# Patient Record
Sex: MALE | Race: WHITE | NOT HISPANIC OR LATINO | Employment: FULL TIME | RURAL
[De-identification: names, ages, dates, MRNs, and addresses within clinical notes are randomized per-mention and may not be internally consistent; named-entity substitution may affect disease eponyms.]

---

## 2018-12-06 ENCOUNTER — HISTORICAL (OUTPATIENT)
Dept: ADMINISTRATIVE | Facility: HOSPITAL | Age: 63
End: 2018-12-06

## 2018-12-10 LAB
LAB AP CLINICAL INFORMATION: NORMAL
LAB AP COMMENTS: NORMAL
LAB AP DIAGNOSIS - HISTORICAL: NORMAL
LAB AP GROSS PATHOLOGY - HISTORICAL: NORMAL
LAB AP SPECIMEN SUBMITTED - HISTORICAL: NORMAL

## 2022-01-21 NOTE — PATIENT INSTRUCTIONS
Sunscreen Recommendations   I recommended a broad spectrum sunscreen with a SPF of 30 or higher that is water-resistant. SPF 30 sunscreens block approximately 97 percent of the sun's harmful rays.    Sunscreens should be applied at least 15 minutes prior to expected sun exposure and then every 90 minutes after that as long as sun exposure continues.    If swimming or exercising sunscreen should be reapplied every 45 minutes to an hour after getting wet or sweating.   One ounce, or the equivalent of a shot glass full of sunscreen, is adequate to protect the skin not covered by a bathing suit.    I also recommended a lip balm with a sunscreen as well.   Healthy Sun Protective Behaviors   Sun protective clothing can be used in lieu of sunscreen but must be worn the entire time you are exposed to the sun's rays.   Seek shade between 10 a.m. and 2 p.m.   Use extra caution near water, snow, or sand as they reflect sun rays   Avoid tanning beds and consider sunless self-tanning products instead   Perform monthly self skin exams     Cryotherapy   There will likely be a blister.    Clean the area daily with dial antibacterial soap and water.    Apply vaseline as needed.    The area will take 1-2 weeks to heal.

## 2022-01-24 ENCOUNTER — OFFICE VISIT (OUTPATIENT)
Dept: DERMATOLOGY | Facility: CLINIC | Age: 67
End: 2022-01-24
Payer: COMMERCIAL

## 2022-01-24 VITALS — BODY MASS INDEX: 24.38 KG/M2 | HEIGHT: 74 IN | RESPIRATION RATE: 18 BRPM | WEIGHT: 190 LBS

## 2022-01-24 DIAGNOSIS — L82.0 SEBORRHEIC KERATOSES, INFLAMED: ICD-10-CM

## 2022-01-24 DIAGNOSIS — L57.8 OTHER SKIN CHANGES DUE TO CHRONIC EXPOSURE TO NONIONIZING RADIATION: Primary | ICD-10-CM

## 2022-01-24 DIAGNOSIS — L82.1 SK (SEBORRHEIC KERATOSIS): ICD-10-CM

## 2022-01-24 PROCEDURE — 1160F PR REVIEW ALL MEDS BY PRESCRIBER/CLIN PHARMACIST DOCUMENTED: ICD-10-PCS | Mod: ,,, | Performed by: DERMATOLOGY

## 2022-01-24 PROCEDURE — 99213 PR OFFICE/OUTPT VISIT, EST, LEVL III, 20-29 MIN: ICD-10-PCS | Mod: 25,,, | Performed by: DERMATOLOGY

## 2022-01-24 PROCEDURE — 1159F MED LIST DOCD IN RCRD: CPT | Mod: ,,, | Performed by: DERMATOLOGY

## 2022-01-24 PROCEDURE — 3288F FALL RISK ASSESSMENT DOCD: CPT | Mod: ,,, | Performed by: DERMATOLOGY

## 2022-01-24 PROCEDURE — 1101F PT FALLS ASSESS-DOCD LE1/YR: CPT | Mod: ,,, | Performed by: DERMATOLOGY

## 2022-01-24 PROCEDURE — 17110 PR DESTRUCTION BENIGN LESIONS UP TO 14: ICD-10-PCS | Mod: ,,, | Performed by: DERMATOLOGY

## 2022-01-24 PROCEDURE — 17110 DESTRUCTION B9 LES UP TO 14: CPT | Mod: ,,, | Performed by: DERMATOLOGY

## 2022-01-24 PROCEDURE — 1101F PR PT FALLS ASSESS DOC 0-1 FALLS W/OUT INJ PAST YR: ICD-10-PCS | Mod: ,,, | Performed by: DERMATOLOGY

## 2022-01-24 PROCEDURE — 3288F PR FALLS RISK ASSESSMENT DOCUMENTED: ICD-10-PCS | Mod: ,,, | Performed by: DERMATOLOGY

## 2022-01-24 PROCEDURE — 3008F PR BODY MASS INDEX (BMI) DOCUMENTED: ICD-10-PCS | Mod: ,,, | Performed by: DERMATOLOGY

## 2022-01-24 PROCEDURE — 1159F PR MEDICATION LIST DOCUMENTED IN MEDICAL RECORD: ICD-10-PCS | Mod: ,,, | Performed by: DERMATOLOGY

## 2022-01-24 PROCEDURE — 1160F RVW MEDS BY RX/DR IN RCRD: CPT | Mod: ,,, | Performed by: DERMATOLOGY

## 2022-01-24 PROCEDURE — 3008F BODY MASS INDEX DOCD: CPT | Mod: ,,, | Performed by: DERMATOLOGY

## 2022-01-24 PROCEDURE — 99213 OFFICE O/P EST LOW 20 MIN: CPT | Mod: 25,,, | Performed by: DERMATOLOGY

## 2022-01-24 RX ORDER — BENZONATATE 200 MG/1
CAPSULE ORAL
COMMUNITY
Start: 2021-12-02

## 2022-01-24 RX ORDER — DEXTROMETHORPHAN POLISTIREX 30 MG/5ML
SUSPENSION ORAL
COMMUNITY

## 2022-01-24 RX ORDER — CHLOPHEDIANOL HCL AND PYRILAMINE MALEATE 12.5; 12.5 MG/5ML; MG/5ML
SOLUTION ORAL
COMMUNITY
Start: 2021-12-02

## 2022-01-24 RX ORDER — PSEUDOEPHEDRINE HCL 30 MG/1
30 TABLET, FILM COATED ORAL EVERY 8 HOURS PRN
COMMUNITY
Start: 2021-12-02

## 2022-01-24 RX ORDER — TADALAFIL 20 MG/1
TABLET ORAL
COMMUNITY
Start: 2021-12-02

## 2022-01-24 RX ORDER — PSEUDOEPHEDRINE HCL 30 MG
TABLET ORAL
COMMUNITY
Start: 2021-12-02

## 2022-01-24 NOTE — PROGRESS NOTES
[facility]   Brit Mejia MD    Patient Name: Alex Guzman  Patient YOB: 1955   Date of Service: 1/24/22    CC: Lesion    HPI: Alex Guzman is a 66 y.o. male here today for lesion, located on the right temple.  Lesion has been present for 4 months.  Previous treatments include cryo therapy.     Past Medical History:   Diagnosis Date    Actinic keratoses     BCC (basal cell carcinoma of skin)     12/18 right upper cutaneous lip     Past Surgical History:   Procedure Laterality Date    BASAL CELL CARCINOMA EXCISION       Review of patient's allergies indicates:  No Known Allergies    Current Outpatient Medications:     benzonatate (TESSALON) 200 MG capsule, 1 capsule as needed, Disp: , Rfl:     pseudoephedrine (SUDAFED) 30 MG tablet, 1 tablet as needed, Disp: , Rfl:     pyrilamine-chlophedianoL (NINJACOF) 12.5-12.5 mg/5 mL Liqd, 2 tsp, Disp: , Rfl:     benzonatate (TESSALON) 200 MG capsule, TAKE 1 PERLE (Do Not Chew) BY MOUTH 3 TIMES DAILY AS NEEDED FOR COUGHING CAUSES DROWSINESS-AVOID ALCOHOL!!, Disp: , Rfl:     dextromethorphan (DELSYM 12 HOUR) 30 mg/5 mL liquid, 10 ml as needed, Disp: , Rfl:     NINJACOF 12.5-12.5 mg/5 mL Liqd, TAKE 2 TEASPOONFULS (10ML) BY MOUTH EVERY 8 HOURS ..MAY CAUSE DROWSINESS-NO ALCOHOL, Disp: , Rfl:     SUDOGEST 30 mg tablet, Take 30 mg by mouth every 8 (eight) hours as needed., Disp: , Rfl:     tadalafiL (CIALIS) 20 MG Tab, TAKE 1/2 TO 1 TABLET BY MOUTH EVERY 24 HOURS TO 36 HOURS AS NEEDED (DO NOT TAKE WITH NITROGLYCERIN, NITRATES), Disp: , Rfl:     ROS: A focused review of systems was obtained and negative.     Exam: A focused skin exam was performed. All areas examined were normal except as mentioned in the assessment and plan below.  General Appearance of the patient is well developed and well nourished.  Orientation: alert and oriented x 3.  Mood and affect: pleasant.    Assessment:   The primary encounter diagnosis was Other skin changes due to  chronic exposure to nonionizing radiation. Diagnoses of SK (seborrheic keratosis) and Seborrheic keratoses, inflamed were also pertinent to this visit.    Plan:      Seborrheic Keratosis (L82.1)  - Stuck-on, warty, greasy brown papule with pseudo-horn cysts scattered on the trunk and extremities    Plan: Counseling.  I counseled the patient regarding the following:  Skin Care: Seborrheic Keratoses are benign. No treatment is necessary.  Expectations: Seborrheic Keratoses are benign warty growths. Patients get more of them as they age    Plan: Reassurance  Irritated Seborrheic Keratoses (L82.0)  Stuck-on inflamed papules with crust located on the right temple  Associated diagnoses: Pruritus and Cutaneous Inflammation    Plan: Liquid Nitrogen.  A total of 1 lesions were treated with liquid nitrogen, located on the above listed location.  This procedure was medically necessary because the lesions that were treated were: irritated and itchy. The  patient's consent was obtained including but not limited to risks of crusting, scabbing, blistering, scarring, darker  or lighter pigmentary change, recurrence, incomplete removal and infection.    Other Skin Changes Due to Chronic Exposure of Nonionizing Radiation (L57.8)    Plan: Monitoring.     Plan: Sunscreen Recommendations.  I recommended a broad spectrum sunscreen with a SPF of 30 or higher. I explained that SPF 30 sunscreens block approximately 97 percent of the  sun's harmful rays. Sunscreens should be applied at least 15 minutes prior to expected sun exposure and then every 2 hours after that as long as  sun exposure continues. If swimming or exercising sunscreen should be reapplied every 45 minutes to an hour after getting wet or sweating. One  ounce, or the equivalent of a shot glass full of sunscreen, is adequate to protect the skin not covered by a bathing suit. I also recommended a lip  balm with a sunscreen as well. Sun protective clothing can be used in lieu of  sunscreen but must be worn the entire time you are exposed to the  sun's rays.      Follow up if symptoms worsen or fail to improve.    Brit Mejia MD

## 2022-11-15 ENCOUNTER — OFFICE VISIT (OUTPATIENT)
Dept: OTOLARYNGOLOGY | Facility: CLINIC | Age: 67
End: 2022-11-15
Payer: COMMERCIAL

## 2022-11-15 VITALS — WEIGHT: 190 LBS | BODY MASS INDEX: 24.38 KG/M2 | HEIGHT: 74 IN

## 2022-11-15 DIAGNOSIS — H61.21 IMPACTED CERUMEN OF RIGHT EAR: Primary | ICD-10-CM

## 2022-11-15 DIAGNOSIS — H90.2 CONDUCTIVE HEARING LOSS, UNSPECIFIED LATERALITY: ICD-10-CM

## 2022-11-15 PROCEDURE — 99213 OFFICE O/P EST LOW 20 MIN: CPT | Mod: PBBFAC | Performed by: OTOLARYNGOLOGY

## 2022-11-15 PROCEDURE — 69210 REMOVE IMPACTED EAR WAX UNI: CPT | Mod: PBBFAC | Performed by: OTOLARYNGOLOGY

## 2022-11-15 PROCEDURE — 99499 NO LOS: ICD-10-PCS | Mod: S$PBB,,, | Performed by: OTOLARYNGOLOGY

## 2022-11-15 PROCEDURE — 69210 PR REMOVAL IMPACTED CERUMEN REQUIRING INSTRUMENTATION, UNILATERAL: ICD-10-PCS | Mod: S$PBB,,, | Performed by: OTOLARYNGOLOGY

## 2022-11-15 PROCEDURE — 69210 REMOVE IMPACTED EAR WAX UNI: CPT | Mod: S$PBB,,, | Performed by: OTOLARYNGOLOGY

## 2022-11-15 PROCEDURE — 99499 UNLISTED E&M SERVICE: CPT | Mod: S$PBB,,, | Performed by: OTOLARYNGOLOGY

## 2022-11-15 NOTE — PROGRESS NOTES
Subjective:       Patient ID: Alex Guzman is a 67 y.o. male.    Chief Complaint: Cerumen Impaction (Patient presents for a bilateral ear cleaning. )    HPI  Review of Systems   Constitutional:  Negative for chills, fatigue and fever.   HENT:  Positive for hearing loss. Negative for ear discharge and ear pain.    All other systems reviewed and are negative.      Objective:      Physical Exam  Constitutional:       Appearance: Normal appearance.   HENT:      Head: Normocephalic.      Right Ear: Tympanic membrane, ear canal and external ear normal. There is impacted cerumen.      Left Ear: Tympanic membrane, ear canal and external ear normal.      Nose: Nose normal.      Mouth/Throat:      Lips: Pink.      Mouth: Mucous membranes are moist.      Pharynx: Oropharynx is clear.   Eyes:      Pupils: Pupils are equal, round, and reactive to light.   Pulmonary:      Effort: Pulmonary effort is normal.   Skin:     General: Skin is warm and dry.   Neurological:      Mental Status: He is alert and oriented to person, place, and time.   Psychiatric:         Mood and Affect: Mood normal.         Behavior: Behavior is cooperative.       Procedure:   Binocular microscopy with removal of cerumen impaction using microsurgical instrumentation.  After explaining the procedure and obtaining verbal assent, right external auditory canal visualized with the 250 mm focal length lens through the operating microscope. The obstructing cerumen was removed with microsurgical instrumentation to reveal normal and healthy external auditory canals. The patient tolerated this procedure well without complication.     Assessment:       Problem List Items Addressed This Visit    None  Visit Diagnoses       Impacted cerumen of right ear    -  Primary    Conductive hearing loss, unspecified laterality                Plan:   Follow up as needed

## 2023-01-09 ENCOUNTER — OFFICE VISIT (OUTPATIENT)
Dept: DERMATOLOGY | Facility: CLINIC | Age: 68
End: 2023-01-09
Payer: COMMERCIAL

## 2023-01-09 VITALS — BODY MASS INDEX: 24.39 KG/M2 | HEIGHT: 74 IN | WEIGHT: 190.06 LBS

## 2023-01-09 DIAGNOSIS — L57.8 OTHER SKIN CHANGES DUE TO CHRONIC EXPOSURE TO NONIONIZING RADIATION: Primary | ICD-10-CM

## 2023-01-09 DIAGNOSIS — D48.9 NEOPLASM OF UNCERTAIN BEHAVIOR: ICD-10-CM

## 2023-01-09 DIAGNOSIS — L57.0 AK (ACTINIC KERATOSIS): ICD-10-CM

## 2023-01-09 DIAGNOSIS — L82.1 SEBORRHEIC KERATOSES: ICD-10-CM

## 2023-01-09 PROCEDURE — 17003 DESTRUCTION, PREMALIGNANT LESIONS; SECOND THROUGH 14 LESIONS: ICD-10-PCS | Mod: ICN,,, | Performed by: DERMATOLOGY

## 2023-01-09 PROCEDURE — 11102 TANGNTL BX SKIN SINGLE LES: CPT | Mod: ICN,,, | Performed by: DERMATOLOGY

## 2023-01-09 PROCEDURE — 99213 OFFICE O/P EST LOW 20 MIN: CPT | Mod: 25,ICN,, | Performed by: DERMATOLOGY

## 2023-01-09 PROCEDURE — 88305 TISSUE EXAM BY PATHOLOGIST: CPT | Mod: 26,,, | Performed by: PATHOLOGY

## 2023-01-09 PROCEDURE — 17000 DESTRUCT PREMALG LESION: CPT | Mod: XS,ICN,, | Performed by: DERMATOLOGY

## 2023-01-09 PROCEDURE — 99213 PR OFFICE/OUTPT VISIT, EST, LEVL III, 20-29 MIN: ICD-10-PCS | Mod: 25,ICN,, | Performed by: DERMATOLOGY

## 2023-01-09 PROCEDURE — 17000 PR DESTRUCTION(LASER SURGERY,CRYOSURGERY,CHEMOSURGERY),PREMALIGNANT LESIONS,FIRST LESION: ICD-10-PCS | Mod: XS,ICN,, | Performed by: DERMATOLOGY

## 2023-01-09 PROCEDURE — 88305 TISSUE EXAM BY PATHOLOGIST: CPT | Mod: TC,SUR | Performed by: DERMATOLOGY

## 2023-01-09 PROCEDURE — 88305 PATHOLOGY, DERMATOLOGY: ICD-10-PCS | Mod: 26,,, | Performed by: PATHOLOGY

## 2023-01-09 PROCEDURE — 11102 PR TANGENTIAL BIOPSY, SKIN, SINGLE LESION: ICD-10-PCS | Mod: ICN,,, | Performed by: DERMATOLOGY

## 2023-01-09 PROCEDURE — 17003 DESTRUCT PREMALG LES 2-14: CPT | Mod: ICN,,, | Performed by: DERMATOLOGY

## 2023-01-09 NOTE — PATIENT INSTRUCTIONS
Biopsy Site Care  Starting tomorrow you may shower and wash the area with dial antibacterial soap  Pat dry and apply vaseline and a bandaid  Perform this routine for three days  The area will be irritated, sore, slightly red, and may itch, sting, or burn  Do not apply make-up to the area until it is healed  There will be a scar  The area will take 1-2 weeks to heal  No soaking in the tub or hot tub for one week     Cryotherapy  There will likely be a blister.   Clean the area daily with dial antibacterial soap and water.   Apply vaseline as needed.   The area will take 1-2 weeks to heal.

## 2023-01-09 NOTE — PROGRESS NOTES
[facility]   Brit Meija MD    Patient Name: Alex Guzman  Patient YOB: 1955   Date of Service: 1/9/23    CC: Lesion    HPI: Alex Guzman is a 67 y.o. male here today for lesion, located on the vertex scalp.  Lesion has been present for 1 years.  Previous treatments include cryotherapy.      Past Medical History:   Diagnosis Date    Actinic keratoses     BCC (basal cell carcinoma of skin)     12/18 right upper cutaneous lip     Past Surgical History:   Procedure Laterality Date    BASAL CELL CARCINOMA EXCISION       Review of patient's allergies indicates:  No Known Allergies    Current Outpatient Medications:     dextromethorphan (DELSYM) 30 mg/5 mL liquid, 10 ml as needed, Disp: , Rfl:     pseudoephedrine (SUDAFED) 30 MG tablet, 1 tablet as needed, Disp: , Rfl:     tadalafiL (CIALIS) 20 MG Tab, TAKE 1/2 TO 1 TABLET BY MOUTH EVERY 24 HOURS TO 36 HOURS AS NEEDED (DO NOT TAKE WITH NITROGLYCERIN, NITRATES), Disp: , Rfl:     benzonatate (TESSALON) 200 MG capsule, 1 capsule as needed, Disp: , Rfl:     benzonatate (TESSALON) 200 MG capsule, TAKE 1 PERLE (Do Not Chew) BY MOUTH 3 TIMES DAILY AS NEEDED FOR COUGHING CAUSES DROWSINESS-AVOID ALCOHOL!!, Disp: , Rfl:     NINJACOF 12.5-12.5 mg/5 mL Liqd, TAKE 2 TEASPOONFULS (10ML) BY MOUTH EVERY 8 HOURS ..MAY CAUSE DROWSINESS-NO ALCOHOL, Disp: , Rfl:     pyrilamine-chlophedianoL (NINJACOF) 12.5-12.5 mg/5 mL Liqd, 2 tsp, Disp: , Rfl:     SUDOGEST 30 mg tablet, Take 30 mg by mouth every 8 (eight) hours as needed., Disp: , Rfl:     ROS: A focused review of systems was obtained and negative.     Exam: A focused skin exam was performed. All areas examined were normal except as mentioned in the assessment and plan below.  General Appearance of the patient is well developed and well nourished.  Orientation: alert and oriented x 3.  Mood and affect: pleasant.    Assessment:   The primary encounter diagnosis was Other skin changes due to chronic exposure to  nonionizing radiation. Diagnoses of AK (actinic keratosis), Neoplasm of uncertain behavior, and Seborrheic keratoses were also pertinent to this visit.    Plan:        Neoplasm of Uncertain Behavior (D48.5)  - Keratotic papule located on the frontal midline scalp  Ddx includes: AK vs SCC    Plan: Counseling.  I counseled the patient regarding the following:  Instructions: Neoplasms of Uncertain Behavior can be observed, biopsied or surgically removed depending on the  level of clinical suspicion.  Instructions: Neoplasms of Uncertain Behavior can be observed, biopsied or surgically removed depending on the  level of clinical suspicion.  Contact Office if: patient develops any new lesions that fail to heal, ulcerate or bleed.    Plan: Biopsy by Shave Method.  Location (1): frontal midline scalp  Written consent was obtained and risks were reviewed including but not  limited to scarring, infection, bleeding, scabbing, incomplete removal, nerve damage and allergy to anesthesia.  The area was prepped with Chloraprep. Local anesthesia was obtained with approximately 0.5cc of 1% lidocaine  with epinephrine. A biopsy by shave method to the level of the dermis (sent for H and E) was performed using  a Dermablade on the above location. Aluminum chloride was used for hemostasis. Following the biopsy  Petrolatum and a bandage were applied. Patient will be notified of biopsy results. However, patient instructed to  call the office if not contacted within 2 weeks.    Actinic Keratoses(L57.0)  - Erythematous patches and papules with hyperkeratotic scale distributed on the scalp and right cheek.    Plan: Counseling.  I counseled the patient regarding the following:  Skin Care: Sun protective clothing and broad spectrum sunscreen can prevent the formation of Actinic  Keratoses. AKs can resolve with cryotherapy, photodynamic therapy, imiquimod, topical 5-FU.  Expectations: Actinic Keratoses are precancerous proliferations that occur  within sun damaged skin. If untreated,  a small subset of AKs can develop into Squamous Cell Carcinoma.  Contact Office if: If AKs fail to resolve despite treatment, or if you develop a side effect from therapy, such as  unbearable crusting, scabbing, redness and tenderness.    Plan: Liquid Nitrogen.  A total of 4 lesions were treated with liquid nitrogen for 2 freeze-thaw cycles lasting 5 seconds, located on the above locations.   The patient's consent was obtained including but not limited to risks of crusting, scabbing,  blistering, scarring, darker or lighter pigmentary change, recurrence, incomplete removal and infection.    Seborrheic Keratosis (L82.1)  - Stuck-on, warty, greasy brown papule with pseudo-horn cysts scattered on the trunk and extremities    Plan: Counseling.  I counseled the patient regarding the following:  Skin Care: Seborrheic Keratoses are benign. No treatment is necessary.  Expectations: Seborrheic Keratoses are benign warty growths. Patients get more of them as they age    Plan: Reassurance    Other Skin Changes Due to Chronic Exposure of Nonionizing Radiation (L57.8)    Plan: Monitoring.     Plan: Sunscreen Recommendations.  I recommended a broad spectrum sunscreen with a SPF of 30 or higher. I explained that SPF 30 sunscreens block approximately 97 percent of the  sun's harmful rays. Sunscreens should be applied at least 15 minutes prior to expected sun exposure and then every 2 hours after that as long as  sun exposure continues. If swimming or exercising sunscreen should be reapplied every 45 minutes to an hour after getting wet or sweating. One  ounce, or the equivalent of a shot glass full of sunscreen, is adequate to protect the skin not covered by a bathing suit. I also recommended a lip  balm with a sunscreen as well. Sun protective clothing can be used in lieu of sunscreen but must be worn the entire time you are exposed to the  sun's rays.    Follow up in about 6 months (around  7/9/2023) for FSE.    Brit Mejia MD

## 2023-01-11 LAB
ESTROGEN SERPL-MCNC: NORMAL PG/ML
INSULIN SERPL-ACNC: NORMAL U[IU]/ML
LAB AP GROSS DESCRIPTION: NORMAL
LAB AP LABORATORY NOTES: NORMAL
LAB AP SPEC A DDX: NORMAL
LAB AP SPEC A MORPHOLOGY: NORMAL
LAB AP SPEC A PROCEDURE: NORMAL
T3RU NFR SERPL: NORMAL %

## 2023-03-01 ENCOUNTER — OFFICE VISIT (OUTPATIENT)
Dept: DERMATOLOGY | Facility: CLINIC | Age: 68
End: 2023-03-01
Payer: COMMERCIAL

## 2023-03-01 VITALS — WEIGHT: 190.06 LBS | BODY MASS INDEX: 24.39 KG/M2 | HEIGHT: 74 IN

## 2023-03-01 DIAGNOSIS — L57.0 AK (ACTINIC KERATOSIS): ICD-10-CM

## 2023-03-01 DIAGNOSIS — L72.0 EPIDERMAL CYST: Primary | ICD-10-CM

## 2023-03-01 PROCEDURE — 17000 PR DESTRUCTION(LASER SURGERY,CRYOSURGERY,CHEMOSURGERY),PREMALIGNANT LESIONS,FIRST LESION: ICD-10-PCS | Mod: ,,, | Performed by: DERMATOLOGY

## 2023-03-01 PROCEDURE — 17003 DESTRUCT PREMALG LES 2-14: CPT | Mod: ,,, | Performed by: DERMATOLOGY

## 2023-03-01 PROCEDURE — 99212 OFFICE O/P EST SF 10 MIN: CPT | Mod: 25,,, | Performed by: DERMATOLOGY

## 2023-03-01 PROCEDURE — 99212 PR OFFICE/OUTPT VISIT, EST, LEVL II, 10-19 MIN: ICD-10-PCS | Mod: 25,,, | Performed by: DERMATOLOGY

## 2023-03-01 PROCEDURE — 17003 DESTRUCTION, PREMALIGNANT LESIONS; SECOND THROUGH 14 LESIONS: ICD-10-PCS | Mod: ,,, | Performed by: DERMATOLOGY

## 2023-03-01 PROCEDURE — 17000 DESTRUCT PREMALG LESION: CPT | Mod: ,,, | Performed by: DERMATOLOGY

## 2023-03-01 NOTE — PROGRESS NOTES
[facility]   Brit Mejia MD    Patient Name: Alex Guzman  Patient YOB: 1955   Date of Service: 3/1/23    CC: Lesion    HPI: Alex Guzman is a 67 y.o. male here today for lesion, located on the frontal midline scalp.  Lesion has been present for 2 weeks.  Previous treatments include biopsy.  Patient is also concerned today about lesion on right upper arm.    Past Medical History:   Diagnosis Date    Actinic keratoses     BCC (basal cell carcinoma of skin)     12/18 right upper cutaneous lip     Past Surgical History:   Procedure Laterality Date    BASAL CELL CARCINOMA EXCISION       Review of patient's allergies indicates:  No Known Allergies    Current Outpatient Medications:     benzonatate (TESSALON) 200 MG capsule, TAKE 1 PERLE (Do Not Chew) BY MOUTH 3 TIMES DAILY AS NEEDED FOR COUGHING CAUSES DROWSINESS-AVOID ALCOHOL!!, Disp: , Rfl:     dextromethorphan (DELSYM) 30 mg/5 mL liquid, 10 ml as needed, Disp: , Rfl:     NINJACOF 12.5-12.5 mg/5 mL Liqd, TAKE 2 TEASPOONFULS (10ML) BY MOUTH EVERY 8 HOURS ..MAY CAUSE DROWSINESS-NO ALCOHOL, Disp: , Rfl:     pseudoephedrine (SUDAFED) 30 MG tablet, 1 tablet as needed, Disp: , Rfl:     pyrilamine-chlophedianoL (NINJACOF) 12.5-12.5 mg/5 mL Liqd, 2 tsp, Disp: , Rfl:     SUDOGEST 30 mg tablet, Take 30 mg by mouth every 8 (eight) hours as needed., Disp: , Rfl:     tadalafiL (CIALIS) 20 MG Tab, TAKE 1/2 TO 1 TABLET BY MOUTH EVERY 24 HOURS TO 36 HOURS AS NEEDED (DO NOT TAKE WITH NITROGLYCERIN, NITRATES), Disp: , Rfl:     benzonatate (TESSALON) 200 MG capsule, 1 capsule as needed, Disp: , Rfl:     ROS: A focused review of systems was obtained and negative.     Exam: A focused skin exam was performed. All areas examined were normal except as mentioned in the assessment and plan below.  General Appearance of the patient is well developed and well nourished.  Orientation: alert and oriented x 3.  Mood and affect: pleasant.    Assessment:   The primary  encounter diagnosis was Epidermal cyst. A diagnosis of AK (actinic keratosis) was also pertinent to this visit.    Plan:      Actinic Keratoses(L57.0)  - Erythematous patches and papules with hyperkeratotic scale distributed on the midline scalp.    Plan: Counseling.  I counseled the patient regarding the following:  Skin Care: Sun protective clothing and broad spectrum sunscreen can prevent the formation of Actinic  Keratoses. AKs can resolve with cryotherapy, photodynamic therapy, imiquimod, topical 5-FU.  Expectations: Actinic Keratoses are precancerous proliferations that occur within sun damaged skin. If untreated,  a small subset of AKs can develop into Squamous Cell Carcinoma.  Contact Office if: If AKs fail to resolve despite treatment, or if you develop a side effect from therapy, such as  unbearable crusting, scabbing, redness and tenderness.    Plan: Liquid Nitrogen.  A total of 2 lesions were treated with liquid nitrogen for 2 freeze-thaw cycles lasting 5 seconds, located on the above locations.   The patient's consent was obtained including but not limited to risks of crusting, scabbing,  blistering, scarring, darker or lighter pigmentary change, recurrence, incomplete removal and infection.    Epidermal Cyst  - subcutaneous cyst with prominent follicular pore located on the right axilla    Plan: Counseling  I counseled the patient regarding the following:  Skin Care: Epidermal Cysts require no specific skin care.  Expectations: Epidermal Cysts are benign sacs within the skin that contain keratin.  Contact Office if: Epidermal Cysts rupture or become red and tender.    - pt declines excision     Follow up if symptoms worsen or fail to improve.    Brit Mejia MD

## 2023-04-04 ENCOUNTER — OFFICE VISIT (OUTPATIENT)
Dept: DERMATOLOGY | Facility: CLINIC | Age: 68
End: 2023-04-04
Payer: COMMERCIAL

## 2023-04-04 VITALS — HEIGHT: 74 IN | WEIGHT: 190.06 LBS | BODY MASS INDEX: 24.39 KG/M2

## 2023-04-04 DIAGNOSIS — D48.9 NEOPLASM OF UNCERTAIN BEHAVIOR: Primary | ICD-10-CM

## 2023-04-04 PROCEDURE — 88305 TISSUE EXAM BY PATHOLOGIST: CPT | Mod: 26,,, | Performed by: PATHOLOGY

## 2023-04-04 PROCEDURE — 11102 PR TANGENTIAL BIOPSY, SKIN, SINGLE LESION: ICD-10-PCS | Mod: ,,, | Performed by: DERMATOLOGY

## 2023-04-04 PROCEDURE — 88305 PATHOLOGY, DERMATOLOGY: ICD-10-PCS | Mod: 26,,, | Performed by: PATHOLOGY

## 2023-04-04 PROCEDURE — 99499 NO LOS: ICD-10-PCS | Mod: ,,, | Performed by: DERMATOLOGY

## 2023-04-04 PROCEDURE — 88305 TISSUE EXAM BY PATHOLOGIST: CPT | Mod: TC,SUR | Performed by: DERMATOLOGY

## 2023-04-04 PROCEDURE — 11102 TANGNTL BX SKIN SINGLE LES: CPT | Mod: ,,, | Performed by: DERMATOLOGY

## 2023-04-04 PROCEDURE — 99499 UNLISTED E&M SERVICE: CPT | Mod: ,,, | Performed by: DERMATOLOGY

## 2023-04-04 NOTE — PROGRESS NOTES
[facility]   Brit Mejia MD    Patient Name: Alex Guzman  Patient YOB: 1955   Date of Service: 4/4/23    CC: Lesion    HPI: Alex Guzman is a 68 y.o. male here today for lesion, located on the nose.  Lesion has been present for 2 months.  Previous treatments include   none.     Past Medical History:   Diagnosis Date    Actinic keratoses     BCC (basal cell carcinoma of skin)     12/18 right upper cutaneous lip     Past Surgical History:   Procedure Laterality Date    BASAL CELL CARCINOMA EXCISION       Review of patient's allergies indicates:  No Known Allergies    Current Outpatient Medications:     benzonatate (TESSALON) 200 MG capsule, 1 capsule as needed, Disp: , Rfl:     benzonatate (TESSALON) 200 MG capsule, TAKE 1 PERLE (Do Not Chew) BY MOUTH 3 TIMES DAILY AS NEEDED FOR COUGHING CAUSES DROWSINESS-AVOID ALCOHOL!!, Disp: , Rfl:     dextromethorphan (DELSYM) 30 mg/5 mL liquid, 10 ml as needed, Disp: , Rfl:     NINJACOF 12.5-12.5 mg/5 mL Liqd, TAKE 2 TEASPOONFULS (10ML) BY MOUTH EVERY 8 HOURS ..MAY CAUSE DROWSINESS-NO ALCOHOL, Disp: , Rfl:     pseudoephedrine (SUDAFED) 30 MG tablet, 1 tablet as needed, Disp: , Rfl:     pyrilamine-chlophedianoL (NINJACOF) 12.5-12.5 mg/5 mL Liqd, 2 tsp, Disp: , Rfl:     SUDOGEST 30 mg tablet, Take 30 mg by mouth every 8 (eight) hours as needed., Disp: , Rfl:     tadalafiL (CIALIS) 20 MG Tab, TAKE 1/2 TO 1 TABLET BY MOUTH EVERY 24 HOURS TO 36 HOURS AS NEEDED (DO NOT TAKE WITH NITROGLYCERIN, NITRATES), Disp: , Rfl:     ROS: A focused review of systems was obtained and negative.     Exam: A focused skin exam was performed. All areas examined were normal except as mentioned in the assessment and plan below.  General Appearance of the patient is well developed and well nourished.  Orientation: alert and oriented x 3.  Mood and affect: pleasant.    Assessment:   The encounter diagnosis was Neoplasm of uncertain behavior.    Plan:      Neoplasm of Uncertain  Behavior (D48.5)  - Eroded papule located on the left nasal bridge  Ddx includes: BCC vs AK    Plan: Counseling.  I counseled the patient regarding the following:  Instructions: Neoplasms of Uncertain Behavior can be observed, biopsied or surgically removed depending on the  level of clinical suspicion.  Instructions: Neoplasms of Uncertain Behavior can be observed, biopsied or surgically removed depending on the  level of clinical suspicion.  Contact Office if: patient develops any new lesions that fail to heal, ulcerate or bleed.    Plan: Biopsy by Shave Method.  Location (1): left nasal bridge  Written consent was obtained and risks were reviewed including but not  limited to scarring, infection, bleeding, scabbing, incomplete removal, nerve damage and allergy to anesthesia.  The area was prepped with Chloraprep. Local anesthesia was obtained with approximately 0.5cc of 1% lidocaine  with epinephrine. A biopsy by shave method to the level of the dermis (sent for H and E) was performed using  a Dermablade on the above location. Aluminum chloride was used for hemostasis. Following the biopsy  Petrolatum and a bandage were applied. Patient will be notified of biopsy results. However, patient instructed to  call the office if not contacted within 2 weeks.    Follow up if symptoms worsen or fail to improve.    Brit Mejia MD

## 2023-04-10 ENCOUNTER — PROCEDURE VISIT (OUTPATIENT)
Dept: DERMATOLOGY | Facility: CLINIC | Age: 68
End: 2023-04-10
Payer: COMMERCIAL

## 2023-04-10 VITALS — HEART RATE: 57 BPM | SYSTOLIC BLOOD PRESSURE: 117 MMHG | DIASTOLIC BLOOD PRESSURE: 72 MMHG

## 2023-04-10 DIAGNOSIS — C44.311 BASAL CELL CARCINOMA (BCC) OF LEFT SIDE OF NOSE: Primary | ICD-10-CM

## 2023-04-10 PROCEDURE — 17311 MOHS 1 STAGE H/N/HF/G: CPT | Mod: ,,, | Performed by: STUDENT IN AN ORGANIZED HEALTH CARE EDUCATION/TRAINING PROGRAM

## 2023-04-10 PROCEDURE — 17312: ICD-10-PCS | Mod: ,,, | Performed by: STUDENT IN AN ORGANIZED HEALTH CARE EDUCATION/TRAINING PROGRAM

## 2023-04-10 PROCEDURE — 14060 PR ADJ TISS XFER LID,NOS,EAR <10 SQCM: ICD-10-PCS | Mod: 51,,, | Performed by: STUDENT IN AN ORGANIZED HEALTH CARE EDUCATION/TRAINING PROGRAM

## 2023-04-10 PROCEDURE — 99203 PR OFFICE/OUTPT VISIT, NEW, LEVL III, 30-44 MIN: ICD-10-PCS | Mod: 25,,, | Performed by: STUDENT IN AN ORGANIZED HEALTH CARE EDUCATION/TRAINING PROGRAM

## 2023-04-10 PROCEDURE — 14060 TIS TRNFR E/N/E/L 10 SQ CM/<: CPT | Mod: 51,,, | Performed by: STUDENT IN AN ORGANIZED HEALTH CARE EDUCATION/TRAINING PROGRAM

## 2023-04-10 PROCEDURE — 17312 MOHS ADDL STAGE: CPT | Mod: ,,, | Performed by: STUDENT IN AN ORGANIZED HEALTH CARE EDUCATION/TRAINING PROGRAM

## 2023-04-10 PROCEDURE — 99203 OFFICE O/P NEW LOW 30 MIN: CPT | Mod: 25,,, | Performed by: STUDENT IN AN ORGANIZED HEALTH CARE EDUCATION/TRAINING PROGRAM

## 2023-04-10 PROCEDURE — 17311: ICD-10-PCS | Mod: ,,, | Performed by: STUDENT IN AN ORGANIZED HEALTH CARE EDUCATION/TRAINING PROGRAM

## 2023-04-10 RX ORDER — GENTAMICIN SULFATE 1 MG/G
OINTMENT TOPICAL DAILY
Qty: 15 G | Refills: 5 | Status: SHIPPED | OUTPATIENT
Start: 2023-04-10

## 2023-04-10 NOTE — PROGRESS NOTES
Mohs Surgery Consult Note    Alex Guzman is a 68 y.o. male who is referred by Dr. Mejia for evaluation of a BCC on the left nasal sidewall.     Recurrent skin cancer: No    Preoperative Risk Factors:  Current Anticoagulants: No  Endocarditis / Rheumatic Fever hx: No  Immunocompromised: No  Prosthetic joint: No  Congenital heart defect: No  Prosthetic heart valve: No  Diabetic: No  Transplant: No  Pacemaker: No  Defibrillator:  No  Prior problem with local anesthesia: No  Tobacco History: No]  Clindamycin Allergy: No  Pregnant: no     Transmissible Diseases:  HIV No  Hepatitis B or C  No      Exam:  Limited skin exam is normal except for a  BCC  located on the left nasal bridge  .    Pathologic Findings:  Accession # [id number]  Diagnosis: BCC    Assessment and Plan:  Treatment Options : The various treatment options for skin cancer removal were reviewed with the patient in detail. These include Mohs surgery with its high cure rate, excisional surgery, destructive treatment, radiation therapy, and various topical therapies.  Given the indications and high cure rate, the patient has agreed to proceed with Mohs.  Risks and Benefits : The rationale for Mohs was explained to the patient. The risks and benefits to therapy were discussed in detail. Specifically, the risks of infection, scarring, bleeding, dehiscence, hematoma, prolonged wound healing, incomplete removal, allergy to anesthesia, nerve injury, inability to clear the tumor, and recurrence were addressed. The treatment site was clearly identified and confirmed by the patient.    Plan:  Mohs Micrographic Surgery    Indication for Mohs: Clinical area critical for tissue conservation (Area H: central face, eyelids, eyebrows, nose, lips, chin, ear, periauricular, temple, genitalia, hands, feet, ankles, nail units and areola)  Mohs AUC Score: 9   Proposed closure: Advancement   Indication for antibiotics: Gentamicin ointment daily x 7 days       Reji Shaver MD    Mohs Surgery/Dermatologic Oncology

## 2023-04-10 NOTE — PATIENT INSTRUCTIONS

## 2023-04-10 NOTE — PROGRESS NOTES
Mohs Surgery Operative Note    Patient name: Alex Guzman  Date: 04/10/2023    Mohs accession #:   Previous dermpath accession #: [id number]  Location: left nasal bridge  Preop diagnosis:BCC  Postop diagnosis: Same  Mohs AUC score: 9  Number of stages: 2  Preop size: 0.6 x 0.6 cm  Postop size: 1.2 x 1.1 cm  Depth of defect: muscle  Repair type: Advancement    Surgeon and Pathologist: NAT Shaver MD     Indications for Mohs Surgery    Removal of the patient's tumor is complicated by the following clinical features: Clinical area critical for tissue conservation (Area H: central face, eyelids, eyebrows, nose, lips, chin, ear, periauricular, temple, genitalia, hands, feet, ankles, nail units and areola) and Poorly-defined clinical tumor borders    Based on my medical judgement, Mohs surgery is the most appropriate treatment for this cancer compared to other treatments. I discussed alternative treatments to Mohs surgery and specifically discussed the risks and benefits of curettage, excision with permanent sections, and foregoing treatment. The rationale for Mohs was explained to the patient and consent was obtained. The risks, benefits and alternatives to therapy were discussed in detail. Specifically, the risks of infection, scarring, bleeding, prolonged wound healing, incomplete removal, allergy to anesthesia, nerve injury and recurrence were addressed. Prior to the procedure, the treatment site was clearly identified and confirmed by the patient. All components of Universal Protocol/PAUSE Rule completed. ROSENDO Shaver MD operated in two distinct and integrated capacities as the surgeon and pathologist.    STAGE I:  The patient was placed on the operating table. The cancer was identified and outlined. The entire surgical field was prepped with chlorhexidine The surgical site was anesthetized using Lidocaine 1% with epinephrine 1:100,000 buffered with sodium bicarbonate 8.4% in a 1:10 ratio.    The area of  clinically apparent tumor was debulked with a 2 mm curette. The layer of tissue was then surgically excised using a #15 blade and was then transferred onto a specimen sheet maintaining the orientation of the specimen. Hemostasis was obtained using monopolar electrodesiccation. The wound site was then covered with a dressing while the tissue samples were processed for examination.    The specimen was oriented, mapped and divided. Each section was then inked and processed in the Mohs lab using the Mohs protocol and submitted for frozen section. The histopathologic sections were reviewed by the surgeon in conjunction with the reference map.     Total blocks: 1 Total slides: 2    Frozen section analysis revealed: residual tumor present on stage 1. Tumor was indicated in red on the reference map.    Cell morphology: Nests of basaloid islands with abundant mucin, palisading, and retraction   Pathological Pattern: Nodular basal cell carcinoma  Depth of invasion: Fat  Presence of scar tissue: No  Perineural invasion: No  Actinic keratosis: No  Inflammation obscuring possible tumor presence: No    STAGE II:  The patient was prepped in the same fashion as the first stage. Using a similar technique to that described above, a thin layer of tissue was removed from all areas where tumor was visible on the previous stage. The tissue was again oriented, mapped, dyed, and processed as above. Histopathologic sections were reviewed in conjunction with the reference map.     Total blocks: 1 Total slides: 2    Frozen section analysis revealed: No additional tumor identified on microscopic examination by the surgeon. Histology: No malignant cells seen in the sections examined.    Additional Histologic Findings: None    REPAIR: Advancement Flap    Indication for flap: A flap was chosen because closing the wound by second intention, primary linear closure, or when skin grafting would result in a functionally unsatisfactory result.  Additionally, a flap was chosen to recruit additional tissue, displace tension away from the defect and any nearby free margins, as well as to re-orient tension vectors in more favorable directions    Primary Surgeon : NAT Shaver MD  Repair Size: 3 x 3 cm  Sutures:  4-0 monocryl; 5-0 prolene     The defect was identified and a marking pen was used to plan the repair. The area was infiltrated with Lidocaine 1% with epinephrine 1:100,000 buffered with sodium bicarbonate 8.4% in a 1:10 ratio, prepped with chlorhexidine and draped with sterile towels.  The flap was incised using a #15 blade to adipose and undermined widely. The defect was debeveled and undermined and an adjacent standing cone was removed. Hemostasis was obtained using monopolar electrodesiccation. The flap was advanced and secured with buried vertical mattress sutures placed in the dermis and subcutaneous tissue. An additional standing cone was removed opposite the flap to minimize tissue deformity. Percutaneous simple running sutures were carefully placed for maximum eversion and meticulous wound edge approximation. Careful attention was paid to avoid distorting any nearby free margins.  The wound was cleansed with saline and ointment was applied along the wound surface. A sterile pressure dressing was applied. Wound care instructions were given verbally and in writing. The patient left the operating suite in stable condition. Patient was informed that additional refinement of the resulting surgical scar may be used as a second stage of this reconstruction.    Reji Shaver MD   Mohs Surgery/Dermatologic Oncology

## 2023-04-17 ENCOUNTER — CLINICAL SUPPORT (OUTPATIENT)
Dept: DERMATOLOGY | Facility: CLINIC | Age: 68
End: 2023-04-17
Payer: COMMERCIAL

## 2023-04-17 DIAGNOSIS — L08.9 SKIN INFECTION: ICD-10-CM

## 2023-04-17 DIAGNOSIS — Z48.02 ENCOUNTER FOR REMOVAL OF SUTURES: Primary | ICD-10-CM

## 2023-04-17 PROCEDURE — 87077 CULTURE AEROBIC IDENTIFY: CPT | Mod: ,,, | Performed by: CLINICAL MEDICAL LABORATORY

## 2023-04-17 PROCEDURE — 87186 CULTURE, WOUND: ICD-10-PCS | Mod: ,,, | Performed by: CLINICAL MEDICAL LABORATORY

## 2023-04-17 PROCEDURE — 99024 PR POST-OP FOLLOW-UP VISIT: ICD-10-PCS | Mod: ,,, | Performed by: STUDENT IN AN ORGANIZED HEALTH CARE EDUCATION/TRAINING PROGRAM

## 2023-04-17 PROCEDURE — 99024 POSTOP FOLLOW-UP VISIT: CPT | Mod: ,,, | Performed by: STUDENT IN AN ORGANIZED HEALTH CARE EDUCATION/TRAINING PROGRAM

## 2023-04-17 PROCEDURE — 87077 CULTURE, WOUND: ICD-10-PCS | Mod: ,,, | Performed by: CLINICAL MEDICAL LABORATORY

## 2023-04-17 PROCEDURE — 87070 CULTURE, WOUND: ICD-10-PCS | Mod: ,,, | Performed by: CLINICAL MEDICAL LABORATORY

## 2023-04-17 PROCEDURE — 87186 SC STD MICRODIL/AGAR DIL: CPT | Mod: ,,, | Performed by: CLINICAL MEDICAL LABORATORY

## 2023-04-17 PROCEDURE — 87070 CULTURE OTHR SPECIMN AEROBIC: CPT | Mod: ,,, | Performed by: CLINICAL MEDICAL LABORATORY

## 2023-04-17 NOTE — PROGRESS NOTES
Mohs accession #:   Previous dermpath accession #: [id number]  Location: left nasal bridge  Preop diagnosis:BCC  Postop diagnosis: Same  Mohs AUC score: 9  Number of stages: 2  Preop size: 0.6 x 0.6 cm  Postop size: 1.2 x 1.1 cm  Depth of defect: muscle  Repair type: Advancement    Sutures removed with no complications  RTC in 6 weeks for wound check  Wound culture obtained.   Cass Cortes,CMA

## 2023-04-21 LAB — MICROORGANISM SPEC CULT: ABNORMAL

## 2023-05-15 ENCOUNTER — OFFICE VISIT (OUTPATIENT)
Dept: DERMATOLOGY | Facility: CLINIC | Age: 68
End: 2023-05-15
Payer: COMMERCIAL

## 2023-05-15 DIAGNOSIS — Z85.828 HISTORY OF NONMELANOMA SKIN CANCER: ICD-10-CM

## 2023-05-15 DIAGNOSIS — L57.0 AK (ACTINIC KERATOSIS): ICD-10-CM

## 2023-05-15 DIAGNOSIS — L57.8 OTHER SKIN CHANGES DUE TO CHRONIC EXPOSURE TO NONIONIZING RADIATION: Primary | ICD-10-CM

## 2023-05-15 DIAGNOSIS — L82.1 SK (SEBORRHEIC KERATOSIS): ICD-10-CM

## 2023-05-15 PROCEDURE — 17000 PR DESTRUCTION(LASER SURGERY,CRYOSURGERY,CHEMOSURGERY),PREMALIGNANT LESIONS,FIRST LESION: ICD-10-PCS | Mod: 79,,, | Performed by: DERMATOLOGY

## 2023-05-15 PROCEDURE — 99213 OFFICE O/P EST LOW 20 MIN: CPT | Mod: 25,,, | Performed by: DERMATOLOGY

## 2023-05-15 PROCEDURE — 99213 PR OFFICE/OUTPT VISIT, EST, LEVL III, 20-29 MIN: ICD-10-PCS | Mod: 25,,, | Performed by: DERMATOLOGY

## 2023-05-15 PROCEDURE — 17000 DESTRUCT PREMALG LESION: CPT | Mod: 79,,, | Performed by: DERMATOLOGY

## 2023-05-15 NOTE — PATIENT INSTRUCTIONS
Sunscreen Recommendations  I recommended a broad spectrum sunscreen with a SPF of 30 or higher that is water-resistant. SPF 30 sunscreens block approximately 97 percent of the sun's harmful rays.   Sunscreens should be applied at least 15 minutes prior to expected sun exposure and then every 90 minutes after that as long as sun exposure continues.   If swimming or exercising sunscreen should be reapplied every 45 minutes to an hour after getting wet or sweating.  One ounce, or the equivalent of a shot glass full of sunscreen, is adequate to protect the skin not covered by a bathing suit.   I also recommended a lip balm with a sunscreen as well.   Healthy Sun Protective Behaviors  Sun protective clothing can be used in lieu of sunscreen but must be worn the entire time you are exposed to the sun's rays.  Seek shade between 10 a.m. and 2 p.m.  Use extra caution near water, snow, or sand as they reflect sun rays  Avoid tanning beds and consider sunless self-tanning products instead  Perform monthly self skin exams    Cryotherapy  There will likely be a blister.   Clean the area daily with dial antibacterial soap and water.   Apply vaseline as needed.   The area will take 1-2 weeks to heal.

## 2023-05-15 NOTE — PROGRESS NOTES
[facility]   Brit Mejia MD    Patient Name: Alex Guzman  Patient YOB: 1955   Date of Service: 5/15/23    CC: Full Skin Exam    HPI: Alex Guzman is a 68 y.o. male presents today for a full skin exam.  Patient was last seen 04/23 and dermatologic history includes BCC and AK. Patient is concerned today about a lesion located on the right posterior shoulder.  It has been present for 2 month(s). It has not been treated in the past.     Past Medical History:   Diagnosis Date    Actinic keratoses     BCC (basal cell carcinoma of skin)     12/18 right upper cutaneous lip     Past Surgical History:   Procedure Laterality Date    BASAL CELL CARCINOMA EXCISION       Review of patient's allergies indicates:  No Known Allergies    Current Outpatient Medications:     benzonatate (TESSALON) 200 MG capsule, 1 capsule as needed, Disp: , Rfl:     benzonatate (TESSALON) 200 MG capsule, TAKE 1 PERLE (Do Not Chew) BY MOUTH 3 TIMES DAILY AS NEEDED FOR COUGHING CAUSES DROWSINESS-AVOID ALCOHOL!!, Disp: , Rfl:     dextromethorphan (DELSYM) 30 mg/5 mL liquid, 10 ml as needed, Disp: , Rfl:     gentamicin (GARAMYCIN) 0.1 % ointment, Apply topically once daily., Disp: 15 g, Rfl: 5    NINJACOF 12.5-12.5 mg/5 mL Liqd, TAKE 2 TEASPOONFULS (10ML) BY MOUTH EVERY 8 HOURS ..MAY CAUSE DROWSINESS-NO ALCOHOL, Disp: , Rfl:     pseudoephedrine (SUDAFED) 30 MG tablet, 1 tablet as needed, Disp: , Rfl:     pyrilamine-chlophedianoL (NINJACOF) 12.5-12.5 mg/5 mL Liqd, 2 tsp, Disp: , Rfl:     SUDOGEST 30 mg tablet, Take 30 mg by mouth every 8 (eight) hours as needed., Disp: , Rfl:     tadalafiL (CIALIS) 20 MG Tab, TAKE 1/2 TO 1 TABLET BY MOUTH EVERY 24 HOURS TO 36 HOURS AS NEEDED (DO NOT TAKE WITH NITROGLYCERIN, NITRATES), Disp: , Rfl:     ROS: A focused review of systems was obtained and negative.     Exam: A full skin exam was performed including scalp, hair, face, neck, chest, back, abdomen, right arm, left arm, right hand, left  hand, nails, right leg, and left leg.  All areas examined were normal expect as per below in assessment and plan.  General Appearance of the patient is well developed and well nourished.  Orientation: alert and oriented x 3.  Mood and affect: pleasant.    Assessment:   The primary encounter diagnosis was Other skin changes due to chronic exposure to nonionizing radiation. Diagnoses of SK (seborrheic keratosis), AK (actinic keratosis), and History of nonmelanoma skin cancer were also pertinent to this visit.    Plan:        Seborrheic Keratosis (L82.1)  - Stuck-on, warty, greasy brown papule with pseudo-horn cysts scattered on the trunk and extremities    Plan: Counseling.  I counseled the patient regarding the following:  Skin Care: Seborrheic Keratoses are benign. No treatment is necessary.  Expectations: Seborrheic Keratoses are benign warty growths. Patients get more of them as they age    Plan: Reassurance    Actinic Keratoses(L57.0)  - Erythematous patches and papules with hyperkeratotic scale distributed on the left temple.    Plan: Counseling.  I counseled the patient regarding the following:  Skin Care: Sun protective clothing and broad spectrum sunscreen can prevent the formation of Actinic  Keratoses. AKs can resolve with cryotherapy, photodynamic therapy, imiquimod, topical 5-FU.  Expectations: Actinic Keratoses are precancerous proliferations that occur within sun damaged skin. If untreated,  a small subset of AKs can develop into Squamous Cell Carcinoma.  Contact Office if: If AKs fail to resolve despite treatment, or if you develop a side effect from therapy, such as  unbearable crusting, scabbing, redness and tenderness.    Plan: Liquid Nitrogen.  A total of 1 lesions were treated with liquid nitrogen for 2 freeze-thaw cycles lasting 5 seconds, located on the above locations.   The patient's consent was obtained including but not limited to risks of crusting, scabbing,  blistering, scarring, darker or  lighter pigmentary change, recurrence, incomplete removal and infection.    History of non-melanoma skin cancer (Z85.828)  - Well healed scar with NER  Associated diagnosis: Medical surveillance following completed treatment    Plan: Monitoring.    Other Skin Changes Due to Chronic Exposure of Nonionizing Radiation (L57.8)    Plan: Monitoring.     Plan: Sunscreen Recommendations.  I recommended a broad spectrum sunscreen with a SPF of 30 or higher. I explained that SPF 30 sunscreens block approximately 97 percent of the  sun's harmful rays. Sunscreens should be applied at least 15 minutes prior to expected sun exposure and then every 2 hours after that as long as  sun exposure continues. If swimming or exercising sunscreen should be reapplied every 45 minutes to an hour after getting wet or sweating. One  ounce, or the equivalent of a shot glass full of sunscreen, is adequate to protect the skin not covered by a bathing suit. I also recommended a lip  balm with a sunscreen as well. Sun protective clothing can be used in lieu of sunscreen but must be worn the entire time you are exposed to the  sun's rays.        Follow up in about 6 months (around 11/15/2023).    Brit Mejia MD

## 2023-05-30 ENCOUNTER — CLINICAL SUPPORT (OUTPATIENT)
Dept: DERMATOLOGY | Facility: CLINIC | Age: 68
End: 2023-05-30
Payer: COMMERCIAL

## 2023-05-30 DIAGNOSIS — Z51.89 VISIT FOR WOUND CHECK: Primary | ICD-10-CM

## 2023-05-30 NOTE — PROGRESS NOTES
Mohs accession #:   Previous dermpath accession #: [id number]  Location: left nasal bridge  Preop diagnosis:BCC  Postop diagnosis: Same  Mohs AUC score: 9  Number of stages: 2  Preop size: 0.6 x 0.6 cm  Postop size: 1.2 x 1.1 cm  Depth of defect: muscle  Repair type: Advancement    Patient is here for 6 week wound check. Incision is healing well no s/s of infection noted. Patient is to return to clinic 11/15/23 for FSE with Dr. Mejia. Patient will decide if he wants derma sanding during his visit.         Sam'On GAVIN Strong/IVC

## 2023-08-22 ENCOUNTER — OFFICE VISIT (OUTPATIENT)
Dept: DERMATOLOGY | Facility: CLINIC | Age: 68
End: 2023-08-22
Payer: COMMERCIAL

## 2023-08-22 DIAGNOSIS — Z48.89 ENCOUNTER FOR POST SURGICAL WOUND CHECK: Primary | ICD-10-CM

## 2023-08-22 PROCEDURE — 99499 UNLISTED E&M SERVICE: CPT | Mod: ,,, | Performed by: STUDENT IN AN ORGANIZED HEALTH CARE EDUCATION/TRAINING PROGRAM

## 2023-08-22 PROCEDURE — 99499 NO LOS: ICD-10-PCS | Mod: ,,, | Performed by: STUDENT IN AN ORGANIZED HEALTH CARE EDUCATION/TRAINING PROGRAM

## 2023-08-22 NOTE — PROGRESS NOTES
Date: 04/10/2023     Mohs accession #:   Previous dermpath accession #: [id number]  Location: left nasal bridge  Preop diagnosis:BCC  Postop diagnosis: Same  Mohs AUC score: 9  Number of stages: 2  Preop size: 0.6 x 0.6 cm  Postop size: 1.2 x 1.1 cm  Depth of defect: muscle  Repair type: Advancement      Patient here for wound check. No complaints from patient. Incision site healed well. Spitting suture removed.     Viri Fontenot RN

## 2023-10-31 ENCOUNTER — OFFICE VISIT (OUTPATIENT)
Dept: DERMATOLOGY | Facility: CLINIC | Age: 68
End: 2023-10-31
Payer: COMMERCIAL

## 2023-10-31 DIAGNOSIS — Z85.828 HISTORY OF NONMELANOMA SKIN CANCER: ICD-10-CM

## 2023-10-31 DIAGNOSIS — L57.0 ACTINIC KERATOSES: Primary | ICD-10-CM

## 2023-10-31 DIAGNOSIS — L82.1 SEBORRHEIC KERATOSES: ICD-10-CM

## 2023-10-31 DIAGNOSIS — L72.0 EPIDERMAL CYST: ICD-10-CM

## 2023-10-31 PROCEDURE — 17003 DESTRUCT PREMALG LES 2-14: CPT | Mod: ,,, | Performed by: STUDENT IN AN ORGANIZED HEALTH CARE EDUCATION/TRAINING PROGRAM

## 2023-10-31 PROCEDURE — 17000 DESTRUCT PREMALG LESION: CPT | Mod: ,,, | Performed by: STUDENT IN AN ORGANIZED HEALTH CARE EDUCATION/TRAINING PROGRAM

## 2023-10-31 PROCEDURE — 99213 OFFICE O/P EST LOW 20 MIN: CPT | Mod: 25,,, | Performed by: STUDENT IN AN ORGANIZED HEALTH CARE EDUCATION/TRAINING PROGRAM

## 2023-10-31 PROCEDURE — 1159F MED LIST DOCD IN RCRD: CPT | Mod: ,,, | Performed by: STUDENT IN AN ORGANIZED HEALTH CARE EDUCATION/TRAINING PROGRAM

## 2023-10-31 PROCEDURE — 1159F PR MEDICATION LIST DOCUMENTED IN MEDICAL RECORD: ICD-10-PCS | Mod: ,,, | Performed by: STUDENT IN AN ORGANIZED HEALTH CARE EDUCATION/TRAINING PROGRAM

## 2023-10-31 PROCEDURE — 17000 PR DESTRUCTION(LASER SURGERY,CRYOSURGERY,CHEMOSURGERY),PREMALIGNANT LESIONS,FIRST LESION: ICD-10-PCS | Mod: ,,, | Performed by: STUDENT IN AN ORGANIZED HEALTH CARE EDUCATION/TRAINING PROGRAM

## 2023-10-31 PROCEDURE — 99213 PR OFFICE/OUTPT VISIT, EST, LEVL III, 20-29 MIN: ICD-10-PCS | Mod: 25,,, | Performed by: STUDENT IN AN ORGANIZED HEALTH CARE EDUCATION/TRAINING PROGRAM

## 2023-10-31 PROCEDURE — 17003 DESTRUCTION, PREMALIGNANT LESIONS; SECOND THROUGH 14 LESIONS: ICD-10-PCS | Mod: ,,, | Performed by: STUDENT IN AN ORGANIZED HEALTH CARE EDUCATION/TRAINING PROGRAM

## 2023-10-31 NOTE — PROGRESS NOTES
[facility]  Reji Shaver MD    [address] [address]  [phone]    Fax: [phone]    Patient Name: Alex Guzman  Medical Record Number: [id number]  PCP: Chris Andino DO  Age: 68 y.o. : 1955  Contact: [phone] (home) [phone] (work)    History of Present Illness:     Alex Guzman is a 68 y.o.  male here for follow up of history of NMSC ( BCC left nasal bridge s/p Mohs). Patient is concerned with lesion on upper lip.     The patient has no other concerns today.    Review of Systems:     Unremarkable other than mentioned above.     Physical Exam:     General: Relaxed, oriented, alert    Skin examination of the scalp, face, neck, chest, back, abdomen, upper extremities and lower extremities were normal except for as listed below      Assessment and Plan:     1. History of NMSC   Well-healed scar on left nasal bridge  No e/o recurrence   Recommend sunscreen and good photoprotection       2. Seborrheic keratoses   - brown stuck on appearing papules/plaques  - patient educated on benign nature. No treatment necessary unless they become irritated or inflamed     3. Actinic Keratoses  Erythematous, scaly papules on scalp, nose, left temple     Plan: Liquid Nitrogen.  A total of 3 lesions were treated with liquid nitrogen for 2 freeze-thaw cycles lasting 5 seconds, located on the above locations.   The patient's consent was obtained including but not limited to risks of crusting, scabbing,  blistering, scarring, darker or lighter pigmentary change, recurrence, incomplete removal and infection.    Counseling.  Sun protective clothing and broad spectrum sunscreen can prevent the formation of AK.   AKs can be treated with cryotherapy, photodynamic therapy, imiquimod, topical 5-FU.  Actinic Keratoses are precancerous proliferations that occur within sun damaged skin. If untreated,  a small subset of AKs can develop into Squamous Cell Carcinoma.    4.   Milia   - yellow-white cystic papules located on the R upper lip .    Plan: Extractions  Comedonal Lesions located on the above location were removed with an 11 blade and q-tip. Prior to removal the treatment areas were prepped in the usual fashion. Consent was obtained and risks were reviewed including but not limited to scarring, infection, bleeding, scabbing, incomplete removal, and allergy to anesthesia.      5. Epidermal Cyst  - subcutaneous cyst with prominent follicular pore located on the right arm    Plan:   Epidermal Cysts can be excised if necessary.      Return to clinic in 6 months.     AVS printed with patient instructions     Reji Shaver MD   Mohs Surgery/Dermatologic Oncology  Dermatology

## 2024-05-01 ENCOUNTER — OFFICE VISIT (OUTPATIENT)
Dept: DERMATOLOGY | Facility: CLINIC | Age: 69
End: 2024-05-01
Payer: COMMERCIAL

## 2024-05-01 DIAGNOSIS — L57.0 AK (ACTINIC KERATOSIS): Primary | ICD-10-CM

## 2024-05-01 DIAGNOSIS — L82.1 SK (SEBORRHEIC KERATOSIS): ICD-10-CM

## 2024-05-01 DIAGNOSIS — Z85.828 HISTORY OF NONMELANOMA SKIN CANCER: ICD-10-CM

## 2024-05-01 PROCEDURE — 99213 OFFICE O/P EST LOW 20 MIN: CPT | Mod: 25,,, | Performed by: STUDENT IN AN ORGANIZED HEALTH CARE EDUCATION/TRAINING PROGRAM

## 2024-05-01 PROCEDURE — 17000 DESTRUCT PREMALG LESION: CPT | Mod: ,,, | Performed by: STUDENT IN AN ORGANIZED HEALTH CARE EDUCATION/TRAINING PROGRAM

## 2024-05-01 NOTE — PROGRESS NOTES
[facility]  Reji Shaver MD    [address] [address]  [phone]    Fax: [phone]    Patient Name: Alex Guzman  Medical Record Number: [id number]  PCP: Chris Andino DO  Age: 69 y.o. : 1955  Contact: [phone] (home) [phone] (work)    History of Present Illness:     Alex Guzman is a 68 y.o.  male here for follow up of history of NMSC ( BCC left nasal bridge s/p Mohs). Patient is concerned with lesion on left cheek and right temple. Lesions have been present for several months.     The patient has no other concerns today.    Review of Systems:     Unremarkable other than mentioned above.     Physical Exam:     General: Relaxed, oriented, alert    Skin examination of the scalp, face, neck, chest, back, abdomen, upper extremities and lower extremities were normal except for as listed below      Assessment and Plan:     1. History of NMSC   Well-healed scar on left nasal bridge  No e/o recurrence   Recommend sunscreen and good photoprotection       2. Seborrheic keratoses   - brown stuck on appearing papules/plaques  - patient educated on benign nature. No treatment necessary unless they become irritated or inflamed     3. Actinic Keratoses  Erythematous, scaly papules on left cheek    Plan: Liquid Nitrogen.  A total of 1 lesions were treated with liquid nitrogen for 2 freeze-thaw cycles lasting 5 seconds, located on the above locations.   The patient's consent was obtained including but not limited to risks of crusting, scabbing,  blistering, scarring, darker or lighter pigmentary change, recurrence, incomplete removal and infection.    Counseling.  Sun protective clothing and broad spectrum sunscreen can prevent the formation of AK.   AKs can be treated with cryotherapy, photodynamic therapy, imiquimod, topical 5-FU.  Actinic Keratoses are precancerous proliferations that occur within sun damaged skin. If untreated,  a small subset of AKs  can develop into Squamous Cell Carcinoma.      Return to clinic in 1 YEAR    AVS printed with patient instructions     Reji Shaver MD   Mohs Surgery/Dermatologic Oncology  Dermatology

## 2024-11-20 ENCOUNTER — OFFICE VISIT (OUTPATIENT)
Dept: DERMATOLOGY | Facility: CLINIC | Age: 69
End: 2024-11-20
Payer: COMMERCIAL

## 2024-11-20 DIAGNOSIS — L57.8 OTHER SKIN CHANGES DUE TO CHRONIC EXPOSURE TO NONIONIZING RADIATION: ICD-10-CM

## 2024-11-20 DIAGNOSIS — L57.0 AK (ACTINIC KERATOSIS): ICD-10-CM

## 2024-11-20 DIAGNOSIS — L82.1 SK (SEBORRHEIC KERATOSIS): Primary | ICD-10-CM

## 2024-11-20 NOTE — PATIENT INSTRUCTIONS
Cryotherapy  There will likely be a blister.   Clean the area daily with dial antibacterial soap and water.   Apply vaseline as needed.   The area will take 1-2 weeks to heal.

## 2024-11-20 NOTE — PROGRESS NOTES
[facility]   Brit Mejia MD    Patient Name: Alex Guzman  Patient YOB: 1955   Date of Service: 11/20/24    CC: Lesion    HPI: Alex Guzman is a 69 y.o. male here today for lesion, located on the nose.  Lesion has been present for 4 months.  Previous treatments include none.  Patient is also concerned today about lesion located on the back.    Past Medical History:   Diagnosis Date    Actinic keratoses     BCC (basal cell carcinoma of skin)     12/18 right upper cutaneous lip     Past Surgical History:   Procedure Laterality Date    BASAL CELL CARCINOMA EXCISION       Review of patient's allergies indicates:  No Known Allergies    Current Outpatient Medications:     benzonatate (TESSALON) 200 MG capsule, 1 capsule as needed, Disp: , Rfl:     benzonatate (TESSALON) 200 MG capsule, TAKE 1 PERLE (Do Not Chew) BY MOUTH 3 TIMES DAILY AS NEEDED FOR COUGHING CAUSES DROWSINESS-AVOID ALCOHOL!!, Disp: , Rfl:     dextromethorphan (DELSYM) 30 mg/5 mL liquid, 10 ml as needed, Disp: , Rfl:     gentamicin (GARAMYCIN) 0.1 % ointment, Apply topically once daily., Disp: 15 g, Rfl: 5    NINJACOF 12.5-12.5 mg/5 mL Liqd, TAKE 2 TEASPOONFULS (10ML) BY MOUTH EVERY 8 HOURS ..MAY CAUSE DROWSINESS-NO ALCOHOL, Disp: , Rfl:     pseudoephedrine (SUDAFED) 30 MG tablet, 1 tablet as needed, Disp: , Rfl:     pyrilamine-chlophedianoL (NINJACOF) 12.5-12.5 mg/5 mL Liqd, 2 tsp, Disp: , Rfl:     SUDOGEST 30 mg tablet, Take 30 mg by mouth every 8 (eight) hours as needed., Disp: , Rfl:     tadalafiL (CIALIS) 20 MG Tab, TAKE 1/2 TO 1 TABLET BY MOUTH EVERY 24 HOURS TO 36 HOURS AS NEEDED (DO NOT TAKE WITH NITROGLYCERIN, NITRATES), Disp: , Rfl:     ROS: A focused review of systems was obtained and negative.     Exam: A focused skin exam was performed. All areas examined were normal except as mentioned in the assessment and plan below.  General Appearance of the patient is well developed and well nourished.  Orientation: alert and  oriented x 3.  Mood and affect: pleasant.    Assessment:   The primary encounter diagnosis was SK (seborrheic keratosis). Diagnoses of AK (actinic keratosis) and Other skin changes due to chronic exposure to nonionizing radiation were also pertinent to this visit.    Plan:      Seborrheic Keratosis (L82.1)  - Stuck-on, warty, greasy brown papule with pseudo-horn cysts scattered on the trunk and extremities    Plan: Counseling.  I counseled the patient regarding the following:  Skin Care: Seborrheic Keratoses are benign. No treatment is necessary.  Expectations: Seborrheic Keratoses are benign warty growths. Patients get more of them as they age    Plan: Reassurance      Actinic Keratoses(L57.0)  - Erythematous patches and papules with hyperkeratotic scale distributed on the Scalp.    Plan: Counseling.  I counseled the patient regarding the following:  Skin Care: Sun protective clothing and broad spectrum sunscreen can prevent the formation of Actinic  Keratoses. AKs can resolve with cryotherapy, photodynamic therapy, imiquimod, topical 5-FU.  Expectations: Actinic Keratoses are precancerous proliferations that occur within sun damaged skin. If untreated,  a small subset of AKs can develop into Squamous Cell Carcinoma.  Contact Office if: If AKs fail to resolve despite treatment, or if you develop a side effect from therapy, such as  unbearable crusting, scabbing, redness and tenderness.    Plan: Liquid Nitrogen.  A total of 2 lesions were treated with liquid nitrogen for 2 freeze-thaw cycles lasting 5 seconds, located on the above locations.   The patient's consent was obtained including but not limited to risks of crusting, scabbing,  blistering, scarring, darker or lighter pigmentary change, recurrence, incomplete removal and infection.    Other Skin Changes Due to Chronic Exposure of Nonionizing Radiation (L57.8)    Plan: Monitoring.     Plan: Sunscreen Recommendations.  I recommended a broad spectrum sunscreen  with a SPF of 30 or higher. I explained that SPF 30 sunscreens block approximately 97 percent of the  sun's harmful rays. Sunscreens should be applied at least 15 minutes prior to expected sun exposure and then every 2 hours after that as long as  sun exposure continues. If swimming or exercising sunscreen should be reapplied every 45 minutes to an hour after getting wet or sweating. One  ounce, or the equivalent of a shot glass full of sunscreen, is adequate to protect the skin not covered by a bathing suit. I also recommended a lip  balm with a sunscreen as well. Sun protective clothing can be used in lieu of sunscreen but must be worn the entire time you are exposed to the  sun's rays.      Follow up if symptoms worsen or fail to improve.    Brit Mejia MD

## 2025-05-01 ENCOUNTER — OFFICE VISIT (OUTPATIENT)
Dept: DERMATOLOGY | Facility: CLINIC | Age: 70
End: 2025-05-01
Payer: COMMERCIAL

## 2025-05-01 DIAGNOSIS — D23.9 DERMATOFIBROMA: ICD-10-CM

## 2025-05-01 DIAGNOSIS — L82.1 SK (SEBORRHEIC KERATOSIS): ICD-10-CM

## 2025-05-01 DIAGNOSIS — L57.0 AK (ACTINIC KERATOSIS): Primary | ICD-10-CM

## 2025-05-01 DIAGNOSIS — D48.9 NEOPLASM OF UNCERTAIN BEHAVIOR: ICD-10-CM

## 2025-05-01 DIAGNOSIS — L72.0 EPIDERMAL CYST: ICD-10-CM

## 2025-05-01 DIAGNOSIS — Z85.828 HISTORY OF NONMELANOMA SKIN CANCER: ICD-10-CM

## 2025-05-01 NOTE — PROGRESS NOTES
[facility]  Reji Shaver MD    [address] [place] [address]  [phone]    Fax: [phone]    Patient Name: Alex Guzman  Medical Record Number: [id number]  PCP: Chris Andino DO  Age: 70 y.o. : 1955  Contact: [phone] (home) [phone] (work)    History of Present Illness:     Alex Guzman is a 70 y.o.  male here for follow up of history of NMSC ( BCC left nasal bridge s/p Mohs). Patient is concerned with lesion on right arm and back. Patient states lesion has grown.      The patient has no other concerns today.    Review of Systems:     Unremarkable other than mentioned above.     Physical Exam:     General: Relaxed, oriented, alert    Skin examination of the scalp, face, neck, chest, back, abdomen, upper extremities and lower extremities were normal except for as listed below      Assessment and Plan:     1. History of NMSC   Well-healed scar on left nasal bridge  No e/o recurrence   Recommend sunscreen and good photoprotection     2. Seborrheic keratoses   - brown stuck on appearing papules/plaques  - patient educated on benign nature. No treatment necessary unless they become irritated or inflamed     3.  Dermatofibroma   - dome-shaped pink-tan nodule with positive dimple sign on the bilateral ankles     Plan: Reassure  Dermatofibromas are benign. They should be surgically removed if symptomatic or if they grow.    4.Epidermal Cyst  - subcutaneous cyst with prominent follicular pore located on the right upper arm     Plan:   Epidermal Cysts can be excised if necessary.    -excision scheduled 07/10/2025 @8     5. Neoplasm of Uncertain Behavior   - skin colored located on the back  Ddx includes: Neurofibroma     -will shave on 07/10/2025     6.Actinic Keratoses  Erythematous, scaly papules on nose, scalp,     Plan: Liquid Nitrogen.  A total of 2 lesions were treated with liquid nitrogen for 2 freeze-thaw cycles lasting 5 seconds, located on the  above locations.   The patient's consent was obtained including but not limited to risks of crusting, scabbing,  blistering, scarring, darker or lighter pigmentary change, recurrence, incomplete removal and infection.    Counseling.  Sun protective clothing and broad spectrum sunscreen can prevent the formation of AK.   AKs can be treated with cryotherapy, photodynamic therapy, imiquimod, topical 5-FU.  Actinic Keratoses are precancerous proliferations that occur within sun damaged skin. If untreated,  a small subset of AKs can develop into Squamous Cell Carcinoma.          Reji Shaver MD   Mohs Surgery/Dermatologic Oncology  Dermatology

## 2025-06-25 NOTE — PATIENT INSTRUCTIONS
WOUND CARE INSTRUCTIONS    1. Leave your pressure bandage on for 24 hours (unless told to keep it on for 48 hours). You will not need to perform any wound care until this bandage is removed. Please do not get the bandage wet.  2. When you initially begin wound care, you may let the water hit the pressure bandage to loosen it from your skin. The bandage should be removed before bathing/showering.  3. Wash your hands thoroughly before starting wound care. Do not use the same cloth/rag/sponge you would use to wash the remainder of your body as this may introduce bacteria from other areas of your body and possibly cause infection at the surgical site.  4. Please clean the surgery site once to twice daily with a mild liquid soap (i.e. Dove, Cetaphil, Baby shampoo).   5. Dry the area with a fresh Q-tip or clean gauze.  6. Perform Vinegar soak to the area to help prevent infection. Soak the affected area for 5-10 minutes once daily, then pat dry. To make a quart of the vinegar soak, mix 3 tablespoons white vinegar with 1 quart of luke-warm water.   7. Apply a generous amount of Vaseline or Aquaphor to the wound/sutures (If you have been prescribed antibiotic ointment such as Mupirocin or Gentamicin, use this instead of vaseline/aquaphor). If you are not sure of the sanitary condition of any Vaseline/Aquaphor you may have at home, please purchase a new jar or tube.    8. Cut a non-stick bandage pad to fit the area and then use bandaging tape to hold in place. Paper tape is a good option for very sensitive skin types.  9. You will be doing this wound care regimen until sutures are removed   10.  After surgery, you may restart all your medications that were stopped (if applicable).      If your surgical site is on your forehead, or close to the eye area, you will want to use ice packs. Please apply ice packs every hour for 20 minutes while awake. Sleep elevated for the next two nights as this will help decrease the amount of  bruising and swelling you will notice the evening after surgery and into the next morning.   For surgical areas on your arms/legs, try to keep the area elevated above the level of your heart as much as possible. This will help to decrease swelling. Frequent gentle rubbing of your fingers or toes in that area will prevent numbness and stiffness.   If located on your arm/hand, we ask that you do not lift anything heavier than a gallon of milk for two weeks. Keep the arm/hand elevated to help decrease swelling in the wrist and fingers. Do not wear jewelry as impending swelling could cause discomfort.  For surgical areas on your head/neck, do not bend over or stoop down. Do not drop your head, as this increases blood to the surgical area and can induce bleeding. Refrain from use of hair care products, hair coloring, or permanents until sutures have been removed and/or the surgical site has completely healed.    BATHING: Begin bathing/showering once pressure bandage comes off. Do not let direct water pressure hit the surgery site. It is okay if it gets wet, just let the water roll over.    PAIN: Tylenol (Acetaminophen) or NSAIDs such as ibuprofen (Advil) or naproxen (Aleve) are adequate for pain relief in most cases, if you are able to take those medications. Alternating Tylenol (acetaminophen) and NSAIDs at 3 hour intervals works well as these medications work differently. For instance, take 1 g of Tylenol at hour 0, then 200-400 mg of Advil at hour 3 if needed, then 1 g of Tylenol at hour 6 if needed, then 200-400 mg of Advil at hour 9 if needed. Do not exceed the daily limit for either medication, which can be found on the bottle. We try to avoid narcotic medications as much as possible. If you are still having severe pain not managed by the above methods, please call our clinic.    SIGNS OF POSSIBLE INFECTION: Significant redness surrounding the surgery site that is warm to the touch, persistent or worsening pain,  fever or flu-like symptoms, increased swelling to the area, thick yellow discharge, and/or foul odor. Please call our office as soon as possible if you experience any of these symptoms as you may have an infection.     BLEEDING: A mild amount of blood on the bandage is expected. Soaking through the bandage is not normal. If this occurs, remove the soiled bandage and apply uninterrupted pressure for 20 minutes by the clock. If this does not stop the bleeding, hold pressure for another 20 minutes with an ice pack. If bleeding stops, apply a bandage per wound care instructions.     IF THE BLEEDING PERSISTS, PLEASE CALL OUR OFFICE.     Normal office hours:   After hours:     If you have concerns about how your wound is healing and would like to send us a photo, please send us a WAYN message, or call for instructions on how to securely send an email.

## 2025-06-26 ENCOUNTER — PROCEDURE VISIT (OUTPATIENT)
Dept: DERMATOLOGY | Facility: CLINIC | Age: 70
End: 2025-06-26
Payer: COMMERCIAL

## 2025-06-26 VITALS — DIASTOLIC BLOOD PRESSURE: 65 MMHG | SYSTOLIC BLOOD PRESSURE: 123 MMHG

## 2025-06-26 DIAGNOSIS — D48.9 NEOPLASM OF UNCERTAIN BEHAVIOR: Primary | ICD-10-CM

## 2025-06-26 PROCEDURE — 88304 TISSUE EXAM BY PATHOLOGIST: CPT | Mod: TC,SUR | Performed by: STUDENT IN AN ORGANIZED HEALTH CARE EDUCATION/TRAINING PROGRAM

## 2025-06-26 NOTE — PROGRESS NOTES
Excision Consult Note    Alex Guzman is a 70 y.o. male who is referred by Dr. Shaver for evaluation of a NUB on the R upper arm that has grown and become symptomatic.     Recurrent skin cancer: No    Preoperative Risk Factors:  Current Anticoagulants: No  Endocarditis / Rheumatic Fever hx: No  Immunocompromised: No  Prosthetic joint: No  Congenital heart defect: No  Prosthetic heart valve: No  Diabetic: No  Transplant: No  Pacemaker: No  Defibrillator:  No  Prior problem with local anesthesia: No  Tobacco History: No]  Clindamycin Allergy: No  Pregnant: no      Transmissible Diseases:  HIV No  Hepatitis B or C  No      Exam:  Limited skin exam is normal except for a NUB  located on the R upper arm.    Pathologic Findings:  Accession # n/[id number]  Diagnosis: NUB    Assessment and Plan:  Treatment Options : Given the indications and high cure rate, the patient has agreed to proceed with excision  Risks and Benefits : The rationale for excision was explained to the patient. The risks and benefits to therapy were discussed in detail. Specifically, the risks of infection, scarring, bleeding, dehiscence, hematoma, prolonged wound healing, incomplete removal, allergy to anesthesia, nerve injury, inability to clear the lesion and recurrence were addressed. The treatment site was clearly identified and confirmed by the patient.    Plan:  Excision    Reji Shaver MD   Mohs Surgery/Dermatologic Oncology

## 2025-06-26 NOTE — PROGRESS NOTES
[facility]    Reji Shaver MD    Elliptical Excision with Linear Closure    Tumor Type: NUB  Location:  R upper arm  Derm-Path Accession #:  n/[id number]  Lesion Size:  1.5 x 1.7 cm  Surgical Margins: 0   Post op size: 1.5 x 1.7cm  Level of Defect:  fat  Repair Type:  Linear   Repair Length:  2 cm  Sutures: 4-0 monocryl, 5-0 prolene  Amount of lidocaine used: 6 cc of 2% lidocaine with epi  Primary Surgeon: NAT Shaver MD      INDICATIONS:  The risks of bleeding, infection, discomfort, incomplete removal, and scar formation were explained to the patient.  All questions were answered.  After informed consent, confirmation of site and identity, and appropriate instructions, the patient underwent the procedure as follows:    PROCEDURE:  With the patient in a supine position, the lesion was outlined with the above margins. An ellipse was designed around the lesion to conform to relaxed skin tension lines in an effort to minimize scarring and deformity.  The patient was then placed in a supine position.  The lesion and surrounding skin were prepped with chlorhexidine, draped, and anesthetized with 1% lidocaine with epinephrine 1:100,100 buffered with 1:10 sodium bicarbonate.  Using a #15 blade, the skin was excised along premarked lines.  The resulting defect extended through deep subcutaneous tissue.  Wound margins were undermined to limit functional deformity/impairment of adjacent structures.  Bleeding vessels were controlled with  monopolar  electrodessication .  A deep placating retention suture was placed to offload tension to the deep margin. The dermis and subcutaneous tissue were closed with buried vertical mattress sutures.  Epidermal approximation was meticulously refined with simple running sutures, resulting in a linear closure with little to no wound tension.  Blood loss was estimated to be less than 5cc.  The area was coated with petrolatum and covered with a non-adherent dressing followed by gauze and  tape.  Postoperative instructions were reviewed per protocol.  The patient left alert and fully oriented.        Reji Shaver MD

## 2025-06-30 ENCOUNTER — RESULTS FOLLOW-UP (OUTPATIENT)
Dept: DERMATOLOGY | Facility: CLINIC | Age: 70
End: 2025-06-30

## 2025-07-09 ENCOUNTER — CLINICAL SUPPORT (OUTPATIENT)
Dept: DERMATOLOGY | Facility: CLINIC | Age: 70
End: 2025-07-09
Payer: COMMERCIAL

## 2025-07-09 DIAGNOSIS — Z48.02 ENCOUNTER FOR REMOVAL OF SUTURES: Primary | ICD-10-CM

## 2025-07-09 NOTE — PROGRESS NOTES
Tumor Type: NUB  Location:  R upper arm  Derm-Path Accession #:  [id number]/[email or web address]  Lesion Size:  1.5 x 1.7 cm  Surgical Margins: 0   Post op size: 1.5 x 1.7cm  Level of Defect:  fat  Repair Type:  Linear   Repair Length:  2 cm  Sutures: 4-0 monocryl, 5-0 prolene    Sutures removed without incident  Cass Cortes CMA